# Patient Record
Sex: FEMALE
[De-identification: names, ages, dates, MRNs, and addresses within clinical notes are randomized per-mention and may not be internally consistent; named-entity substitution may affect disease eponyms.]

---

## 2019-04-15 PROBLEM — Z00.00 ENCOUNTER FOR PREVENTIVE HEALTH EXAMINATION: Status: ACTIVE | Noted: 2019-04-15

## 2019-04-17 ENCOUNTER — APPOINTMENT (OUTPATIENT)
Dept: ORTHOPEDIC SURGERY | Facility: CLINIC | Age: 59
End: 2019-04-17
Payer: COMMERCIAL

## 2019-04-17 VITALS — WEIGHT: 125 LBS | HEIGHT: 60 IN | BODY MASS INDEX: 24.54 KG/M2

## 2019-04-17 DIAGNOSIS — M25.572 PAIN IN LEFT ANKLE AND JOINTS OF LEFT FOOT: ICD-10-CM

## 2019-04-17 PROCEDURE — 99204 OFFICE O/P NEW MOD 45 MIN: CPT

## 2019-04-17 PROCEDURE — 73610 X-RAY EXAM OF ANKLE: CPT | Mod: LT

## 2019-04-17 NOTE — PHYSICAL EXAM
[de-identified] : Left ankle examination shows no evidence of warmth or swelling. There is a small lateral ankle cyst that is compressible and does decompress with mild pressure on it. There is no pain on palpation is a full range of motion. There is 5 over 5 strength negative anterior drawer neurovascular exam is normal [de-identified] : Left ankle x-ray is normal.

## 2019-04-17 NOTE — HISTORY OF PRESENT ILLNESS
[FreeTextEntry1] : Location: Left ankle\par Quality: no pain \par Duration:4/15/19/2019\par Context: atraumatic \par Aggravating Factors: n/a \par Alleviating Factors:n/a \par Associated Symptoms: weakness \par Prior Studies:n/a\par Noted a late soft tissue 2 days ago\par

## 2019-04-17 NOTE — DISCUSSION/SUMMARY
[de-identified] : This patient most likely has a small ganglion cyst of the lateral aspect of the ankle. There is no pain. I recommended observation. If it gets bigger or worsen she will return. Followup will be as needed all questions answered.